# Patient Record
Sex: FEMALE | Race: WHITE | Employment: FULL TIME | URBAN - METROPOLITAN AREA
[De-identification: names, ages, dates, MRNs, and addresses within clinical notes are randomized per-mention and may not be internally consistent; named-entity substitution may affect disease eponyms.]

---

## 2019-10-17 ENCOUNTER — HOSPITAL ENCOUNTER (OUTPATIENT)
Dept: GENERAL RADIOLOGY | Age: 54
Discharge: HOME | End: 2019-10-19
Payer: COMMERCIAL

## 2019-10-17 DIAGNOSIS — Z12.31 VISIT FOR SCREENING MAMMOGRAM: ICD-10-CM

## 2019-10-17 PROCEDURE — 77063 BREAST TOMOSYNTHESIS BI: CPT

## 2020-10-30 ENCOUNTER — HOSPITAL ENCOUNTER (OUTPATIENT)
Dept: GENERAL RADIOLOGY | Age: 55
Discharge: HOME | End: 2020-11-01
Payer: COMMERCIAL

## 2020-10-30 PROCEDURE — 77063 BREAST TOMOSYNTHESIS BI: CPT

## 2021-05-16 ENCOUNTER — APPOINTMENT (OUTPATIENT)
Dept: GENERAL RADIOLOGY | Age: 56
End: 2021-05-16
Payer: COMMERCIAL

## 2021-05-16 ENCOUNTER — HOSPITAL ENCOUNTER (EMERGENCY)
Age: 56
Discharge: HOME | End: 2021-05-16
Payer: COMMERCIAL

## 2021-05-16 VITALS
RESPIRATION RATE: 18 BRPM | OXYGEN SATURATION: 96 % | DIASTOLIC BLOOD PRESSURE: 78 MMHG | BODY MASS INDEX: 32.18 KG/M2 | SYSTOLIC BLOOD PRESSURE: 126 MMHG | HEIGHT: 67 IN | HEART RATE: 88 BPM | WEIGHT: 205 LBS

## 2021-05-16 DIAGNOSIS — S93.401A SPRAIN OF RIGHT ANKLE, UNSPECIFIED LIGAMENT, INITIAL ENCOUNTER: Primary | ICD-10-CM

## 2021-05-16 DIAGNOSIS — T14.8XXA SKIN ABRASION: ICD-10-CM

## 2021-05-16 PROCEDURE — 6370000000 HC RX 637 (ALT 250 FOR IP)

## 2021-05-16 PROCEDURE — 6370000000 HC RX 637 (ALT 250 FOR IP): Performed by: PHYSICIAN ASSISTANT

## 2021-05-16 PROCEDURE — 99284 EMERGENCY DEPT VISIT MOD MDM: CPT

## 2021-05-16 PROCEDURE — 73630 X-RAY EXAM OF FOOT: CPT

## 2021-05-16 PROCEDURE — 6360000002 HC RX W HCPCS: Performed by: PHYSICIAN ASSISTANT

## 2021-05-16 PROCEDURE — 90715 TDAP VACCINE 7 YRS/> IM: CPT | Performed by: PHYSICIAN ASSISTANT

## 2021-05-16 PROCEDURE — 90471 IMMUNIZATION ADMIN: CPT | Performed by: PHYSICIAN ASSISTANT

## 2021-05-16 PROCEDURE — 73610 X-RAY EXAM OF ANKLE: CPT

## 2021-05-16 RX ORDER — DIAPER,BRIEF,INFANT-TODD,DISP
EACH MISCELLANEOUS
Status: COMPLETED
Start: 2021-05-16 | End: 2021-05-16

## 2021-05-16 RX ORDER — NAPROXEN 500 MG/1
500 TABLET ORAL 2 TIMES DAILY
Qty: 20 TABLET | Refills: 0 | Status: SHIPPED | OUTPATIENT
Start: 2021-05-16 | End: 2021-05-26

## 2021-05-16 RX ORDER — BACITRACIN ZINC AND POLYMYXIN B SULFATE 500; 1000 [USP'U]/G; [USP'U]/G
OINTMENT TOPICAL
Qty: 1 TUBE | Refills: 1 | Status: SHIPPED | OUTPATIENT
Start: 2021-05-16 | End: 2021-05-23

## 2021-05-16 RX ORDER — DIAPER,BRIEF,INFANT-TODD,DISP
EACH MISCELLANEOUS ONCE
Status: COMPLETED | OUTPATIENT
Start: 2021-05-16 | End: 2021-05-16

## 2021-05-16 RX ORDER — ESCITALOPRAM OXALATE 20 MG/1
20 TABLET ORAL DAILY
COMMUNITY

## 2021-05-16 RX ORDER — IBUPROFEN 800 MG/1
800 TABLET ORAL ONCE
Status: COMPLETED | OUTPATIENT
Start: 2021-05-16 | End: 2021-05-16

## 2021-05-16 RX ADMIN — IBUPROFEN 800 MG: 800 TABLET, FILM COATED ORAL at 19:51

## 2021-05-16 RX ADMIN — TETANUS TOXOID, REDUCED DIPHTHERIA TOXOID AND ACELLULAR PERTUSSIS VACCINE, ADSORBED 0.5 ML: 5; 2.5; 8; 8; 2.5 SUSPENSION INTRAMUSCULAR at 19:51

## 2021-05-16 RX ADMIN — Medication 1 G: at 20:18

## 2021-05-16 RX ADMIN — BACITRACIN ZINC 1 G: 500 OINTMENT TOPICAL at 20:18

## 2021-05-16 ASSESSMENT — PAIN DESCRIPTION - ONSET: ONSET: GRADUAL

## 2021-05-16 ASSESSMENT — PAIN DESCRIPTION - LOCATION: LOCATION: ANKLE

## 2021-05-16 ASSESSMENT — PAIN DESCRIPTION - FREQUENCY: FREQUENCY: CONTINUOUS

## 2021-05-16 ASSESSMENT — PAIN SCALES - GENERAL: PAINLEVEL_OUTOF10: 10

## 2021-05-16 NOTE — ED PROVIDER NOTES
[facility]  Department of Emergency Medicine   ED  Encounter Note  Admit Date/RoomTime: 2021  7:28 PM  ED Room:     NAME: La Porter  : 1965  MRN: [id number]     Chief Complaint:  Ankle Pain (fell 1/2 hr PTA, fell forward off the garage floor to gravel, c/o right ankle pain) and Knee Pain (abraded left knee on gravel after fall)    History of Present Illness         La Porter is a 50 y.o. old female presenting to the emergency department by private vehicle, for traumatic Right foot and ankle pain which occured 1 hour(s) prior to arrival.  The complaint is due to a fall from stumbling while at home. Since onset the symptoms have been persistent with ability to bear weight, but with some pain. Patient states \"I tripped over my own feet felt rolling my right ankle and falling and catching myself on my left knee and hands. I does have some scrapes on my knee and hand but my only pain is my right ankle. \"  She denies any head injury or loss of consciousness. Patient is not any blood thinners. Patient  has a prior history of pain to mentioned area related to today's visit and Patient has a prior history of injury to mentioned area related to today's visit. Her pain is aggraveated by any movement and relieved by nothing, as no treatment has been provided prior to this visit. She denies any head injury, headache, loss of consciousness, confusion, dizziness, neck pain, chest pain, abdominal pain, back pain, numbness, weakness, blurred vision, nausea or vomiting. Tetanus Status: more than 10 years ago. ROS   Pertinent positives and negatives are stated within HPI, all other systems reviewed and are negative. Past Medical History:  has no past medical history on file. Surgical History:  has a past surgical history that includes other surgical history. Social History:  reports that she has never smoked.  She has never used smokeless tobacco. She reports current alcohol use. She reports that she does not use drugs. Family History: family history is not on file. Allergies: Penicillins    Physical Exam   Oxygen Saturation Interpretation: Normal.        ED Triage Vitals   BP Temp Temp src Pulse Resp SpO2 Height Weight   05/16/21 1920 -- -- 05/16/21 1920 05/16/21 1920 05/16/21 1920 05/16/21 1931 05/16/21 1931   126/78   88 18 96 % 5' 7\" (1.702 m) 205 lb (93 kg)         Constitutional:  Alert, development consistent with age. Neck:  Normal ROM. Supple. Ankle:  Right Lateral:              Tenderness:  moderate. Swelling: Moderate. Deformity: no.             ROM: full range with pain. Skin:  tenderness and swelling. Neurovascular: Motor deficit: none. Sensory deficit:   none. Pulse deficit: none. Capillary refill: normal.  Knee:  LEFT            Tenderness:  mild. Swelling: None. Deformity: no.             ROM: full range of motion. Skin:  abrasion noted to left knee, no bleeding. Patient has full range of motion. .  Foot:   right            Tenderness:  none. Swelling: None. Deformity: no.             ROM: full range of motion. Skin:  normal exam; no wounds, erythema, or swelling.}. Gait:  normal.   Lymphatics: No lymphangitis or adenopathy noted. Neurological:  Oriented. Motor functions intact. Lab / Imaging Results   (All laboratory and radiology results have been personally reviewed by myself)  Labs:  No results found for this visit on 05/16/21. Imaging: All Radiology results interpreted by Radiologist unless otherwise noted. XR ANKLE RIGHT (MIN 3 VIEWS)   Final Result   1. Lateral malleolar and anterior tibiotalar joint soft tissue swelling and   small joint effusion. No acute osseous findings in the right ankle nor right   foot on these exams.       2.  Sequela of prior trauma seen distal to the right fibula. XR FOOT RIGHT (MIN 3 VIEWS)   Final Result   1. Lateral malleolar and anterior tibiotalar joint soft tissue swelling and   small joint effusion. No acute osseous findings in the right ankle nor right   foot on these exams. 2.  Sequela of prior trauma seen distal to the right fibula. ED Course / Medical Decision Making     Medications   bacitracin zinc ointment (1 g Topical Given 5/16/21 2018)   Tetanus-Diphth-Acell Pertussis (BOOSTRIX) injection 0.5 mL (0.5 mLs Intramuscular Given 5/16/21 1951)   ibuprofen (ADVIL;MOTRIN) tablet 800 mg (800 mg Oral Given 5/16/21 1951)        Consults:   None    Procedure(s): Wound abrasions were cleaned thoroughly and bacitracin was applied with sterile dressings. Patient tolerated procedure well. No active bleeding. MDM:      Patient is a 72-year-old female that presented to the emergency department with right ankle pain after tripping and rolling her ankle and then falling onto her left knee and hands to catch herself. Patient had a thorough examination and patient has full range of motion of left knee and no injury to the hands. Small amount abrasions noted to left knee. Patient's right ankle on the lateral side is swollen moderately with pain with active and passive range of motion. Imaging was obtained based on moderate suspicion for fracture / bony abnormality, dislocation as per history/physical findings. No acute fracture or dislocation was noted of the ankle. Patient was given some ibuprofen and her tetanus was updated. Patient will be given an Ace wrap and told to use rest, ice elevation alternate Tylenol and ibuprofen every 6-8 hours with food. Patient was told to follow-up with her family doctor.   Patient was explained she has worsening symptoms to follow-up with an orthopedic specialist.  Patient states that she has done this numerous times before and has proper follow-up that she can follow with. Patient currently denies any other injury such as headaches, blurry vision, chest pain, shortness breath, fever, chills were nausea, vomiting, severe abdominal pain, change in bowel or bladder movements or any numbness or weakness bilaterally in her extremities. Patient will keep the wounds clean and using triple antibiotic cream.  Patient was advised of worsening signs of infection such as redness, swelling, pus or lymphatic streaking to return to the emergency department immediately. She voiced understanding and agreed with the plan management. Patient is vitally stable and ready for outpatient follow-up. Plan is subsequently for symptom control, limited use and  immobilization with appropriate outpatient follow-up. Patient was explicitly instructed on specific signs and symptoms on which to return to the emergency room for. Patient was instructed to return to the ER for any new or worsening symptoms. Additional discharge instructions were given verbally. All questions were answered. Patient is comfortable and agreeable with discharge plan. Patient in no acute distress and non-toxic in appearance. Plan of Care/Counseling:  I reviewed today's visit with the patient in addition to providing specific details for the plan of care and counseling regarding the diagnosis and prognosis. Questions are answered at this time and are agreeable with the plan. Assessment      1. Sprain of right ankle, unspecified ligament, initial encounter    2. Skin abrasion      Plan   Discharge home. Patient condition is stable    New Medications     New Prescriptions    BACITRACIN-POLYMYXIN B (POLYSPORIN) [id number] UNIT/GM OINTMENT    Apply topically 2 times daily. NAPROXEN (NAPROSYN) 500 MG TABLET    Take 1 tablet by mouth 2 times daily for 10 days     Electronically signed by Sam Miranda PA-C   DD: 5/16/21  **This report was transcribed using voice recognition software.  Every effort was made to ensure accuracy; however, inadvertent computerized transcription errors may be present.   END OF ED PROVIDER NOTE        Marky Teran PA-C  05/16/21 [id number]

## 2021-05-17 NOTE — ED NOTES
Abrasions on left knee cleansed with skintegrity, bacitracin applied to abrasions, and covered with nonadherent bandage and wrapped with gauze. Small cut left hand cleansed with skintegrity, bacitracin and bandage applied.       Christine Archer RN  05/16/21 2027

## 2021-11-11 ENCOUNTER — HOSPITAL ENCOUNTER (OUTPATIENT)
Dept: GENERAL RADIOLOGY | Age: 56
Discharge: HOME | End: 2021-11-13
Payer: COMMERCIAL

## 2021-11-11 DIAGNOSIS — Z12.31 VISIT FOR SCREENING MAMMOGRAM: ICD-10-CM

## 2021-11-11 PROCEDURE — 77063 BREAST TOMOSYNTHESIS BI: CPT

## 2023-05-30 ENCOUNTER — HOSPITAL ENCOUNTER (OUTPATIENT)
Age: 58
Discharge: HOME | End: 2023-06-01
Payer: COMMERCIAL

## 2023-05-30 ENCOUNTER — HOSPITAL ENCOUNTER (OUTPATIENT)
Dept: GENERAL RADIOLOGY | Age: 58
Discharge: HOME | End: 2023-06-01
Attending: FAMILY MEDICINE
Payer: COMMERCIAL

## 2023-05-30 DIAGNOSIS — M54.50 LOW BACK PAIN, UNSPECIFIED BACK PAIN LATERALITY, UNSPECIFIED CHRONICITY, UNSPECIFIED WHETHER SCIATICA PRESENT: ICD-10-CM

## 2023-05-30 PROCEDURE — 72110 X-RAY EXAM L-2 SPINE 4/>VWS: CPT

## 2024-01-15 ENCOUNTER — HOSPITAL ENCOUNTER (OUTPATIENT)
Dept: GENERAL RADIOLOGY | Age: 59
Discharge: HOME | End: 2024-01-17
Payer: COMMERCIAL

## 2024-01-15 VITALS — WEIGHT: 192 LBS | BODY MASS INDEX: 30.13 KG/M2 | HEIGHT: 67 IN

## 2024-01-15 DIAGNOSIS — Z12.31 VISIT FOR SCREENING MAMMOGRAM: ICD-10-CM

## 2024-01-15 PROCEDURE — 77063 BREAST TOMOSYNTHESIS BI: CPT
